# Patient Record
Sex: MALE | URBAN - METROPOLITAN AREA
[De-identification: names, ages, dates, MRNs, and addresses within clinical notes are randomized per-mention and may not be internally consistent; named-entity substitution may affect disease eponyms.]

---

## 2022-01-01 ENCOUNTER — TELEPHONE ENCOUNTER (OUTPATIENT)
Dept: URBAN - METROPOLITAN AREA CLINIC 19 | Facility: CLINIC | Age: 62
End: 2022-01-01

## 2022-01-01 ENCOUNTER — OUT OF OFFICE VISIT (OUTPATIENT)
Dept: URBAN - METROPOLITAN AREA MEDICAL CENTER 25 | Facility: MEDICAL CENTER | Age: 62
End: 2022-01-01
Payer: SELF-PAY

## 2022-01-01 ENCOUNTER — OFFICE VISIT (OUTPATIENT)
Dept: URBAN - METROPOLITAN AREA CLINIC 19 | Facility: CLINIC | Age: 62
End: 2022-01-01

## 2022-01-01 DIAGNOSIS — R93.3 ABN FINDINGS-GI TRACT: ICD-10-CM

## 2022-01-01 DIAGNOSIS — R13.12 DYSPHAGIA: ICD-10-CM

## 2022-01-01 DIAGNOSIS — R19.4 ALTERATION IN BOWEL ELIMINATION: ICD-10-CM

## 2022-01-01 PROCEDURE — 99232 SBSQ HOSP IP/OBS MODERATE 35: CPT | Performed by: INTERNAL MEDICINE

## 2022-01-01 PROCEDURE — 99254 IP/OBS CNSLTJ NEW/EST MOD 60: CPT | Performed by: INTERNAL MEDICINE

## 2022-09-19 PROBLEM — 40739000: Status: ACTIVE | Noted: 2022-01-01

## 2022-09-19 PROBLEM — 235595009: Status: ACTIVE | Noted: 2022-01-01

## 2022-09-19 PROBLEM — 372244006: Status: ACTIVE | Noted: 2022-01-01

## 2022-09-20 NOTE — HPI-TODAY'S VISIT:
Tony is a 62-year-old male who presents on referral from Dr. Arnold at [facility] for weight loss. He has a history of recent dx of metastatic melanoma with multiple brain mets, unknown primary source.  He was admitted to the hospital for right-sided weakness with noted bilateral brain masses suspicious for mets with surrounding edema causing a midline shift.   >CT scan abdomen and pelvis right upper lobe pulmonary nodule, right sided perihilar lymphadenopathy, nonspecific focal thickening of the sigmoid colon underlying mass lesion not entirely excluded. >Labs 9/18/2022 WBC elevated at 16.4, hemoglobin normal, platelets low at 141, BMP normal, hepatitis panel negative, CEA elevated 8.2 >PET scan 8/31/2022 intracranial metastatic disease, right paratracheal, hilar, and aorta cavil lymph nodes likely metastatic disease, posterior right upper lobe nodule, no other activity noted in the abdominal cavity. Never had a colonoscopy. He had an abnormal CT scan that showed sigmoidal wall thickening suspicious for malignant process  He has had about 3 to 4 months of bowel habit changes, changes in the caliber of his stool with intermittent bright red blood.   Also was having some esophageal dysphagia for the last few months and he has a history of reflux.    > Brother has history of esophageal cancer He was placed on Keppra for seizure prophylaxis